# Patient Record
Sex: FEMALE
[De-identification: names, ages, dates, MRNs, and addresses within clinical notes are randomized per-mention and may not be internally consistent; named-entity substitution may affect disease eponyms.]

---

## 2023-06-08 ENCOUNTER — HOSPITAL ENCOUNTER (OUTPATIENT)
Dept: HOSPITAL 47 - PNWHC3 | Age: 48
End: 2023-06-08
Attending: ANESTHESIOLOGY
Payer: COMMERCIAL

## 2023-06-08 VITALS
SYSTOLIC BLOOD PRESSURE: 133 MMHG | DIASTOLIC BLOOD PRESSURE: 89 MMHG | TEMPERATURE: 97.6 F | HEART RATE: 95 BPM | RESPIRATION RATE: 18 BRPM

## 2023-06-08 DIAGNOSIS — M50.322: Primary | ICD-10-CM

## 2023-06-08 DIAGNOSIS — F32.9: ICD-10-CM

## 2023-06-08 DIAGNOSIS — M19.90: ICD-10-CM

## 2023-06-08 DIAGNOSIS — M48.02: ICD-10-CM

## 2023-06-08 DIAGNOSIS — K21.9: ICD-10-CM

## 2023-06-08 PROCEDURE — 99202 OFFICE O/P NEW SF 15 MIN: CPT

## 2023-06-08 NOTE — P.PAINPG
PQRS Measure Charge Sheet


Comment: 





HISTORY OF PRESENT ILLNESS:


47 yr old female as a referral from Dr De La Rosa presents today w severe and chronic

neck pain secondary to stenosis, DDD, spondylosis and facet arthropathy without 

myelopathy for evaluation. Pt states pain level is provoked at 7 /10 in 

intensity, constant, localized in the mid to lower cervical spine, sore in 

character w shooting pain towards the L shoulder and LUE.  Pain is provoked by 

lifting.  Pain is alleviated by medications (Diclofenac), topical, massage 

therapy x 1 in Apr 2023 which provided little relief, chiropractic treatments 

semi weekly x 1 1/2 mo which ended in Mar 2023, repositioning and rest.





PMH: OA, MDD, GERD


PSH: R Breast Lumpectomy, Hysterectomy, R Knee Surgery, C section


SH: Never smoker, Occasional ETOH use, No illicit drug use


FH: Non contributory


All: See list


Meds: See list





REVIEW OF ORGAN SYSTEMS:


                     CONSTITUTIONAL:  No fevers or chills. No recent weight 

loss.


                     NEUROLOGICAL: +  numbness and tingling along the distal 

extremities. No seizure disorders or headaches.


                     MUSCULOSKELETAL: + pain 


                     PSYCHIATRIC:  Denies current depression or suicidal 

thoughts.


    


Physical Examinations  :


                Constitutional : Cooperative , not in acute distress .          

                                                                                

                                                                                

                                                                                

                                                                                

     


                Neurologic :   Cranial nerve II   to  XII  intact. No focal 

neurological deficits.


                Psychiatric : alert & oriented  x 3. Matching mood & appropriate

affect. Judgment & insight intact. 


                Musculoskeletal :     


                               Cervical Spine 


                                         Motor strength in the deltoid and 

biceps: Normal  right side. Normal  Left side


                                         Motor strength biceps and the wrist 

extensors:   Normal right side . Normal left side 


                                         Motor strength in the triceps muscle: 

Normal right side.  Normal  left side  


                                         Deep tendon reflexes:  Normal at the 

biceps. Normal at Brachioradialis. Normal at triceps


                                         Vertebral body tenderness to deep 

palpation over C6


                                         Cervical facet loading test: positive 

bilaterally


                                         Spurling test: positive on L C6


                                         Neck distraction test: positive 

bilaterally


                                         Jorge sign: positive bilaterally


                                  Lumbar spine


                                         Motor strength lower extremities ,thigh

and legs  5/5 Right side ,  5/5  Left side 


                                         Deep tendon reflexes :   Normal  Knee 

Jerk. Normal   Ankle Jerk  


                                         Vertebral body tenderness over 


                                         Scott Test positive


                                         Lumbar facet Loading Test: positive 

Right  / positive Left  


                                         Range of motion of the lumbar spine  

Flexion  30 degrees,   extension   10 degrees


                                         Straight Leg Raise test: Left/ Right 

positive at   degree   


                                         Cherry test: positive right /  positive 

left.


                                         Severe tenderness over the Sacroiliac 

joint  on the Right / Left  sides   


                                         Gaenslen  test: positive bilaterally


                                         Seated flexion test: positive 

bilaterally.


                                 Sacral spine :


                                         Severe tenderness over the Sacroiliac 

joint:  right side / left side 


                                         Range of motion: Flexion of the lumbar 

spine <60 degrees


                                         Range of motion: Extension of the 

lumbar spine <20 degrees


                                         Gaenslen's Test positive 


                                         Douglas's Test positive 


                                         Cherry test: positive  right side /  

left side


                                         Thigh Thrust Test


                                         Sacral Thrust Test


Imaging:


MRI non contrast cervical from 4/3/23 reviewed





Assessment/ Plan : 


Cervical disc bulges, Cervical stenosis


Recommendation of DAMON C6-C7 #1. May need a series of injections for optimal pain

relief. Risks, benefits of procedure discussed and patient verbalized 

understanding. Admits to aspirin or anti- coagulant use or medical history of 

diabetes. Protocol for discontinuation/ continuation of medications john 

procedure discussed. Minimal anesthesia provided, if clinically indicated, 

consisting of Versed and Fentanyl. All questions answered.


I have spent greater than 30 minutes on patient care today. Dr Mccord was 

available by phone for the evaluation of this patient. The time was used to 

review the medical records including relevant urine studies and Prescription 

history (MAPs), review of the available imaging, evaluation and examination of 

the patient, coordination of care with the medical staff and if applicable 

referring physicians, as well as creation of the medical record








Controlled Substance Measures





- Controlled Substance Measures


Is patient prescribed a controlled substance at discharge?: No

## 2023-06-22 ENCOUNTER — HOSPITAL ENCOUNTER (OUTPATIENT)
Dept: HOSPITAL 47 - ORPAIN | Age: 48
Discharge: HOME | End: 2023-06-22
Attending: ANESTHESIOLOGY
Payer: COMMERCIAL

## 2023-06-22 VITALS — RESPIRATION RATE: 16 BRPM

## 2023-06-22 VITALS — HEART RATE: 83 BPM | DIASTOLIC BLOOD PRESSURE: 70 MMHG | SYSTOLIC BLOOD PRESSURE: 108 MMHG

## 2023-06-22 VITALS — BODY MASS INDEX: 33.9 KG/M2

## 2023-06-22 VITALS — TEMPERATURE: 98 F

## 2023-06-22 DIAGNOSIS — M50.123: Primary | ICD-10-CM

## 2023-06-22 DIAGNOSIS — M48.02: ICD-10-CM

## 2023-06-22 PROCEDURE — 62321 NJX INTERLAMINAR CRV/THRC: CPT

## 2023-06-22 NOTE — FL
EXAMINATION TYPE: FL guided pain mgmt statistic

 

DATE OF EXAM: 6/22/2023

 

FLUOROSCOPY

 

Fluoroscopy time of 1 seconds was used during a cervical epidural injection.  1 image/s document/s th
e procedure.  DOSE AREA PRODUCT (DAP) UGY*M,MGY*CM: 0.28219

## 2023-06-22 NOTE — P.PCN
Date of Procedure: 06/22/23


Procedure(s) Performed: 


.





PROCEDURE


1. Cervical epidural steroid injection under fluoroscopic guidance, C6-7  

(fluoroscopy images available in the radiology department   )


2. Cervical epidurogram.  





PREOPERATIVE DIAGNOSIS: 1- Cervical Degenerative Disc Diseases     2- Cervical 

radiculopathy.,  3-cervical spinal stenosis





POSTOPERATIVE DIAGNOSIS: : 1- Cervical Degenerative Disc Diseases , 2- Cervical 

radiculopathy.   3-cervical spinal stenosis





ANESTHESIA:  Local anesthesia with lidocaine 1% 3ml only





EBL 0





PROCEDURE INDICATION: The patient with neck pain and radiculitis unresponsive to

conservative treatment consents for procedure. 





PROCEDURE DESCRIPTION / TECHNIQUE: The patient was seen and identified in the 

preoperative area. Risks, benefits, complications, including but not limited to 

infections ,bleeding , allergic reactions to the medications ,and not complete 

pain releife,  and alternatives were discussed with the patient, the patient 

agreed to proceed with the procedure and signed the consent.


 


Patient was taken to the OR and time out was completed.


The patient was placed in the prone position on the procedure table. A pillow 

was placed under the patients chest to increase the cervical interlaminar 

space. The cervical area was prepped and draped in the usual sterile fashion. 

Vital signs were closely monitored during the procedure. 


Using anterior-posterior fluoroscopy, the C6-7 interlaminar space was identified

and the skin over this site was marked and then infiltrated with 1% lidocaine 

subcutaneously. Subsequently, a 20-gauge 3-1/2-inch Tuohy epidural needle was 

inserted and advanced toward the epidural space by means of the ``hanging-drop

technique and guided by AP and lateral fluoroscopy. The correct needle position 

in the epidural space was verified with the injection of 2  mL of the water 

soluble contrast dye Isovue-200  and observing an excellent epidurogram with the

epidural spread of the dye, after negative aspiration for blood and CSF and in 

the absence of paresthesias. then,  mixture containing 20 mg Dexamethasone  and 

2 ml of preservative-free normal saline  injected and a washout of epidurogram 

was seen. Needle was withdrawn intact, skin was cleansed, and bandages were 

applied. 


Complications= none.


Disposition= patient was placed in supine position and transferred to the 

recovery room area in stable condition and there was no evidence of upper or 

lower extremity motor or sensory deficit after the procedure patient was 

discharged from recovery room after discharge criteria met and home discharge 

instructions was given by the staff and patient will follow with the pain clinic

in 2-4 weeks

## 2023-07-19 ENCOUNTER — HOSPITAL ENCOUNTER (OUTPATIENT)
Dept: HOSPITAL 47 - PNWHC3 | Age: 48
End: 2023-07-19
Attending: ANESTHESIOLOGY
Payer: COMMERCIAL

## 2023-07-19 VITALS
TEMPERATURE: 98.3 F | HEART RATE: 79 BPM | DIASTOLIC BLOOD PRESSURE: 89 MMHG | RESPIRATION RATE: 16 BRPM | SYSTOLIC BLOOD PRESSURE: 128 MMHG

## 2023-07-19 DIAGNOSIS — M48.02: Primary | ICD-10-CM

## 2023-07-19 DIAGNOSIS — M50.323: ICD-10-CM

## 2023-07-19 PROCEDURE — 99211 OFF/OP EST MAY X REQ PHY/QHP: CPT

## 2023-07-19 NOTE — P.PAINPG
PQRS Measure Charge Sheet


Comment: 





47 yr old female presents today w severe and chronic neck pain secondary to 

stenosis, DDD, spondylosis and facet arthropathy without myelopathy for 

evaluation s/p DAMON C6-C7 #1. PT states she experienced 30% pain relief x 1 wks 

s/p procedure. Pt states pain level is provoked at 7 /10 in intensity, constant,

localized in the mid to lower cervical spine, sore in character w shooting pain 

towards the L shoulder and LUE.  Pain is provoked by lifting.  Pain is 

alleviated by medications (Diclofenac), topical, massage therapy x 1 in Apr 2023

which provided little relief, chiropractic treatments semi weekly x 1 1/2 mo 

which ended in Mar 2023, repositioning and rest.





Interventional procedures include DAMON C6-C7 x1


Medications include Diclofenac tabs





REVIEW OF ORGAN SYSTEMS:


                     CONSTITUTIONAL:  No fevers or chills. No recent weight 

loss.


                     NEUROLOGICAL: +  numbness and tingling along the distal 

extremities. No seizure disorders or headaches.


                     MUSCULOSKELETAL: + pain 


                     PSYCHIATRIC:  Denies current depression or suicidal 

thoughts.


    


Physical Examinations  :


                Constitutional : Cooperative , not in acute distress .          

                                                                                

                                                                                

                                                                                

                                                                                

     


                Neurologic :   Cranial nerve II   to  XII  intact. No focal 

neurological deficits.


                Psychiatric : alert & oriented  x 3. Matching mood & appropriate

affect. Judgment & insight intact. 


                Musculoskeletal :     


                               Cervical Spine 


                                         Motor strength in the deltoid and 

biceps: Normal  right side. Normal  Left side


                                         Motor strength biceps and the wrist 

extensors:   Normal right side . Normal left side 


                                         Motor strength in the triceps muscle: 

Normal right side.  Normal  left side  


                                         Deep tendon reflexes:  Normal at the 

biceps. Normal at Brachioradialis. Normal at triceps


                                         Vertebral body tenderness to deep 

palpation over C6


                                         Cervical facet loading test: positive 

bilaterally


                                         Spurling test: positive on L>R C6-C7


                                         Neck distraction test: positive 

bilaterally


                                         Jorge sign: positive bilaterally


                                  Lumbar spine


                                         Motor strength lower extremities ,thigh

and legs  5/5 Right side ,  5/5  Left side 


                                         Deep tendon reflexes :   Normal  Knee 

Jerk. Normal   Ankle Jerk  


                                         Vertebral body tenderness over 


                                         Scott Test positive


                                         Lumbar facet Loading Test: positive 

Right  / positive Left  


                                         Range of motion of the lumbar spine  

Flexion  30 degrees,   extension   10 degrees


                                         Straight Leg Raise test: Left/ Right 

positive at   degree   


                                         Cherry test: positive right /  positive 

left.


                                         Severe tenderness over the Sacroiliac 

joint  on the Right / Left  sides   


                                         Gaenslen  test: positive bilaterally


                                         Seated flexion test: positive 

bilaterally.


                                 Sacral spine :


                                         Severe tenderness over the Sacroiliac 

joint:  right side / left side 


                                         Range of motion: Flexion of the lumbar 

spine <60 degrees


                                         Range of motion: Extension of the 

lumbar spine <20 degrees


                                         Gaenslen's Test positive 


                                         Douglas's Test positive 


                                         Cherry test: positive  right side /  

left side


                                         Thigh Thrust Test


                                         Sacral Thrust Test


Imaging:


MRI non contrast cervical from 4/3/23 reviewed





Assessment/ Plan : 


Cervical disc bulges, Cervical stenosis


Will follow up w Dr De La Rosa to explore additional treatment options. All questions

answered.


I have spent greater than 30 minutes on patient care today. Dr Mccord was 

available by phone for the evaluation of this patient. The time was used to 

review the medical records including relevant urine studies and Prescription 

history (MAPs), review of the available imaging, evaluation and examination of 

the patient, coordination of care with the medical staff and if applicable 

referring physicians, as well as creation of the medical record





PQRS Narrative: 


                                        





Hx Alcohol Use (MH)              Yes: SOCIAL








Home Medications: 


Ambulatory Orders





Desvenlafaxine Succinate [Pristiq ER] 50 mg PO DAILY 06/19/23 


Diclofenac Sodium [Voltaren] 75 mg PO BID 06/19/23 











Controlled Substance Measures





- Controlled Substance Measures


Is patient prescribed a controlled substance at discharge?: No